# Patient Record
Sex: MALE | Race: WHITE | NOT HISPANIC OR LATINO
[De-identification: names, ages, dates, MRNs, and addresses within clinical notes are randomized per-mention and may not be internally consistent; named-entity substitution may affect disease eponyms.]

---

## 2019-01-16 ENCOUNTER — RX RENEWAL (OUTPATIENT)
Age: 64
End: 2019-01-16

## 2019-10-04 ENCOUNTER — CLINICAL ADVICE (OUTPATIENT)
Age: 64
End: 2019-10-04

## 2019-12-05 ENCOUNTER — INBOUND DOCUMENT (OUTPATIENT)
Age: 64
End: 2019-12-05

## 2020-01-03 ENCOUNTER — RECORD ABSTRACTING (OUTPATIENT)
Age: 65
End: 2020-01-03

## 2020-01-03 ENCOUNTER — APPOINTMENT (OUTPATIENT)
Dept: BARIATRICS | Facility: CLINIC | Age: 65
End: 2020-01-03
Payer: MEDICARE

## 2020-01-03 VITALS
DIASTOLIC BLOOD PRESSURE: 71 MMHG | HEIGHT: 69 IN | BODY MASS INDEX: 32.58 KG/M2 | SYSTOLIC BLOOD PRESSURE: 165 MMHG | WEIGHT: 220 LBS | HEART RATE: 83 BPM

## 2020-01-03 DIAGNOSIS — Z87.39 PERSONAL HISTORY OF OTHER DISEASES OF THE MUSCULOSKELETAL SYSTEM AND CONNECTIVE TISSUE: ICD-10-CM

## 2020-01-03 DIAGNOSIS — Z86.69 PERSONAL HISTORY OF OTHER DISEASES OF THE NERVOUS SYSTEM AND SENSE ORGANS: ICD-10-CM

## 2020-01-03 DIAGNOSIS — Z86.39 PERSONAL HISTORY OF OTHER ENDOCRINE, NUTRITIONAL AND METABOLIC DISEASE: ICD-10-CM

## 2020-01-03 DIAGNOSIS — Z85.46 PERSONAL HISTORY OF MALIGNANT NEOPLASM OF PROSTATE: ICD-10-CM

## 2020-01-03 DIAGNOSIS — Z86.79 PERSONAL HISTORY OF OTHER DISEASES OF THE CIRCULATORY SYSTEM: ICD-10-CM

## 2020-01-03 PROCEDURE — 99205 OFFICE O/P NEW HI 60 MIN: CPT

## 2020-01-03 RX ORDER — FAMOTIDINE 20 MG/1
20 TABLET, FILM COATED ORAL
Qty: 180 | Refills: 3 | Status: DISCONTINUED | COMMUNITY
Start: 2019-10-04 | End: 2020-01-03

## 2020-01-03 RX ORDER — RANITIDINE 150 MG/1
150 TABLET ORAL
Qty: 180 | Refills: 3 | Status: DISCONTINUED | COMMUNITY
Start: 2019-01-16 | End: 2020-01-03

## 2020-01-03 RX ORDER — ASPIRIN 81 MG/1
81 TABLET, COATED ORAL
Refills: 0 | Status: ACTIVE | COMMUNITY

## 2020-01-03 NOTE — ASSESSMENT
[FreeTextEntry1] : Behavioral changes discussed today- reduce carbs, increase protein and vegetables, increase water intake, identify emotional eating, prepack lunches or cut down portion sizes of unhealthy foods, healthy snacks\par Provided information for [facility] classes\par WIll refer to RD\par Can consider Contrave- information packet provided- if doing so would only do for 1 month then transfer to [facility] for his surgery/post op period due to naltrexone interaction with opoids\par Discussed interplay between emotional/stress eating and his weight gain\par Will check HbA1C, TSH- will call patient with results and to further discuss medication \par RTO 1 month \par

## 2020-01-03 NOTE — REASON FOR VISIT
[Initial Consultation] : an initial consultation for [Hyperlipidemia] : hyperlipidemia [Hypertension] : hypertension [Obesity] : obesity [Obstructive Sleep Apena] : obstructive sleep apena

## 2020-01-03 NOTE — HISTORY OF PRESENT ILLNESS
[FreeTextEntry1] : 64 year old male for medical weight loss consultation.\par Patient has struggled with his weight over the past year.  Has gained 30 pounds- related to family stressors- his daughter's divorce, moving in with him and his wife with her 2 children (3 and 6 years old).  His daughter works in [place] so he and his wife do most of the childcare.\par Previously lost weight 10 years ago before his daughter's wedding- high protein low carb diet\par Lowest adult weight 167\par Highest weight 220 \par Target weight 189\par Occupation- works with [employer]- sets his own hours, office in [place] but travels to [place]\par Exercise- limited due to knee pain- planning for LTKR 3/2/20 at [facility] Dr. Clanyc \par Water intake- inadequate\par Sleep- treated OMAR- grandchildren wake him up in the middle of the night\par Food recall- B- eggs, sausage, toast L- pizza or leftovers D- ravioli with meatballs S- chips or cheese-its between lunch and dinner\par Motivation- better health

## 2020-01-03 NOTE — CONSULT LETTER
[Dear  ___] : Dear  [unfilled], [Consult Letter:] : I had the pleasure of evaluating your patient, [unfilled]. [( Thank you for referring [unfilled] for consultation for _____ )] : Thank you for referring [unfilled] for consultation for [unfilled] [Consult Closing:] : Thank you very much for allowing me to participate in the care of this patient.  If you have any questions, please do not hesitate to contact me. [Please see my note below.] : Please see my note below. [Sincerely,] : Sincerely, [FreeTextEntry3] : Gwendolyn Barnett

## 2020-01-09 ENCOUNTER — CLINICAL ADVICE (OUTPATIENT)
Age: 65
End: 2020-01-09

## 2020-02-07 ENCOUNTER — RX RENEWAL (OUTPATIENT)
Age: 65
End: 2020-02-07

## 2020-02-07 ENCOUNTER — APPOINTMENT (OUTPATIENT)
Dept: BARIATRICS | Facility: CLINIC | Age: 65
End: 2020-02-07
Payer: MEDICARE

## 2020-02-07 VITALS
SYSTOLIC BLOOD PRESSURE: 117 MMHG | HEART RATE: 76 BPM | WEIGHT: 203 LBS | BODY MASS INDEX: 30.07 KG/M2 | HEIGHT: 69 IN | DIASTOLIC BLOOD PRESSURE: 68 MMHG

## 2020-02-07 DIAGNOSIS — E66.9 OBESITY, UNSPECIFIED: ICD-10-CM

## 2020-02-07 PROCEDURE — 99213 OFFICE O/P EST LOW 20 MIN: CPT

## 2020-02-07 RX ORDER — NALTREXONE HYDROCHLORIDE AND BUPROPION HYDROCHLORIDE 8; 90 MG/1; MG/1
8-90 TABLET, EXTENDED RELEASE ORAL
Qty: 120 | Refills: 0 | Status: DISCONTINUED | COMMUNITY
Start: 2020-01-09 | End: 2020-02-07

## 2020-02-07 NOTE — ASSESSMENT
[FreeTextEntry1] : Patient to decrease Contrave down to 2 tablets/day due to nausea- will switch to bupropion 75mg BID prior to surgery to avoid opioid interaction with naltrexone.  He is paying out of pocket for Contrave through mail order pharmacy- depending on how he feels he may decide to continue on just bupropion long term which would be much less expensive for him.  He is aware that it is off-label.  \par Will continue Nathalia Navarrete- not seeing RD at this time.  May benefit from RD visit in the future when coming off Nathalia Navarrete to avoid weight gain.\par RTO after surgery- approximately 2 months, will call sooner if he has any concerns

## 2020-02-07 NOTE — HISTORY OF PRESENT ILLNESS
[FreeTextEntry1] : 64 year old male for medical weight loss consultation.\par Patient has struggled with his weight over the past year.  Has gained 30 pounds- related to family stressors- his daughter's divorce, moving in with him and his wife with her 2 children (3 and 6 years old).  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 Clancy \par Water intake- inadequate\par Sleep- treated OMAR- grandchildren wake him up in the middle of the night\par Food recall- B- eggs, sausage, toast L- pizza or leftovers D- ravioli with meatballs S- chips or cheese-its between lunch and dinner\par Motivation- better health\par \par 2/7/2020- Patient RTO feeling well.  Lost 17 pounds.  Only side effect of Contrave (3 tablets/day) has been nausea.  Felt better on lower dosage.  Feels a change in his mood, cravings, behaviors (alcohol and gambling too).  Planning for surgery on 3/2/20 for his knee.  Started Nathaliajoshua Navarrete now on 1500kcal diet with good compliance and satiety.

## 2020-02-07 NOTE — REASON FOR VISIT
[Follow-Up Visit] : a follow-up visit for [Hyperlipidemia] : hyperlipidemia [Obesity] : obesity [Obstructive Sleep Apena] : obstructive sleep apena

## 2021-06-14 ENCOUNTER — APPOINTMENT (OUTPATIENT)
Dept: GASTROENTEROLOGY | Facility: CLINIC | Age: 66
End: 2021-06-14

## 2021-10-05 ENCOUNTER — APPOINTMENT (OUTPATIENT)
Dept: GASTROENTEROLOGY | Facility: CLINIC | Age: 66
End: 2021-10-05
Payer: MEDICARE

## 2021-10-05 ENCOUNTER — NON-APPOINTMENT (OUTPATIENT)
Age: 66
End: 2021-10-05

## 2021-10-05 VITALS
WEIGHT: 200 LBS | BODY MASS INDEX: 29.62 KG/M2 | HEART RATE: 75 BPM | TEMPERATURE: 97.1 F | SYSTOLIC BLOOD PRESSURE: 124 MMHG | OXYGEN SATURATION: 98 % | DIASTOLIC BLOOD PRESSURE: 60 MMHG | HEIGHT: 69 IN

## 2021-10-05 DIAGNOSIS — Z00.00 ENCOUNTER FOR GENERAL ADULT MEDICAL EXAMINATION W/OUT ABNORMAL FINDINGS: ICD-10-CM

## 2021-10-05 DIAGNOSIS — Z95.1 PRESENCE OF AORTOCORONARY BYPASS GRAFT: ICD-10-CM

## 2021-10-05 PROCEDURE — 99214 OFFICE O/P EST MOD 30 MIN: CPT

## 2021-10-05 RX ORDER — BUPROPION HYDROCHLORIDE 75 MG/1
75 TABLET, FILM COATED ORAL TWICE DAILY
Qty: 60 | Refills: 2 | Status: DISCONTINUED | COMMUNITY
Start: 2020-02-07 | End: 2021-10-05

## 2021-10-05 RX ORDER — FAMOTIDINE 10 MG/1
TABLET, FILM COATED ORAL
Refills: 0 | Status: DISCONTINUED | COMMUNITY
End: 2021-10-05

## 2021-10-05 NOTE — HISTORY OF PRESENT ILLNESS
[FreeTextEntry1] : patient is doing exceptionally well status post open heart surgery, back in June 4th, 2021, at [facility], with aortic valve replacement, and triple coronary bypass\par his fatigue, weeakness, low energy have all been completely and immediately corrected;  surgeon dr Krzysztof Mata, cardiologist is Dr Chuck Good...\par \par 2.  major symptom now;  is that he has continuous belching, from the time he wakes, untill bedtime, and he wakes up in the morning before breakfast, with more belching\par \par meanwhile he is taking over the counter Nexium, and he has no heartburn, no difficulty swallowing, no real regurgitation\par \par

## 2021-10-05 NOTE — ASSESSMENT
[FreeTextEntry1] : 1. patient had recent extremely successful open heart surgery\par \par 2.  eventually he will need a colonoscopy...history of polyps..last colon july 2016, negkathy advised for five years..but i feel that we are better off deferring for six or so months, \par and i am advising a FIT test as a sensitivie test non invasive, and then next year we do the colonoscopy\par \par meanwhile patient prefers doing it by collecting stool at home\par \par 3.  patient on nexium, has the symptom of constant belching, and it is not at all clear to me that this is reflux...however, we could try temporarily switch to dexilant as the most effective anti reflux drug that we have.. for eight weeks\par \par as a therapeutic trial again as i prefer not beginning a work up with egd

## 2021-10-12 LAB — HEMOCCULT STL QL IA: NEGATIVE

## 2022-04-01 ENCOUNTER — RESULT REVIEW (OUTPATIENT)
Age: 67
End: 2022-04-01

## 2022-04-03 ENCOUNTER — RESULT REVIEW (OUTPATIENT)
Age: 67
End: 2022-04-03

## 2022-04-04 ENCOUNTER — APPOINTMENT (OUTPATIENT)
Dept: GASTROENTEROLOGY | Facility: HOSPITAL | Age: 67
End: 2022-04-04

## 2022-04-04 ENCOUNTER — TRANSCRIPTION ENCOUNTER (OUTPATIENT)
Age: 67
End: 2022-04-04

## 2022-04-05 ENCOUNTER — APPOINTMENT (OUTPATIENT)
Dept: GASTROENTEROLOGY | Facility: CLINIC | Age: 67
End: 2022-04-05

## 2022-04-10 ENCOUNTER — NON-APPOINTMENT (OUTPATIENT)
Age: 67
End: 2022-04-10

## 2022-05-06 ENCOUNTER — APPOINTMENT (OUTPATIENT)
Dept: GASTROENTEROLOGY | Facility: CLINIC | Age: 67
End: 2022-05-06
Payer: MEDICARE

## 2022-05-06 DIAGNOSIS — K21.9 GASTRO-ESOPHAGEAL REFLUX DISEASE W/OUT ESOPHAGITIS: ICD-10-CM

## 2022-05-06 PROCEDURE — 99443: CPT | Mod: 95

## 2022-05-06 NOTE — ASSESSMENT
[FreeTextEntry1] : 1 history of recent removal of adenomatous colonic polyp, follow-up colonoscopy in March 2025\par 2.  Erosive duodenitis with multiple shallow duodenal ulcers and erosions\par Attributed to enteric-coated aspirin even though it was a baby aspirin dose\par He since switched to chewable aspirin or chewable baby aspirin\par 3.  The eructation that he had prior to the procedure has persisted since the procedure as well and is not improved\par This in spite of being on twice daily pantoprazole 40 mg twice daily\par \par Plan\par 1.  Patient has a concern about the long-term use of the PPIs and I share this concern\par 2.  I am suggesting that he take 40 mg of omeprazole once a day in the morning for another month and then decrease for 2 to 3 months to every other morning and then 2 to 3 months for every third morning\par 3.  We will have a return visit telehealth or telephonic in 6 months\par 4.  If he has dyspepsia or worsening eructation on this regimen of reduced omeprazole dosage we can always give him 20 mg of Pepcid in the morning on the days that he does not have omeprazole

## 2022-05-06 NOTE — HISTORY OF PRESENT ILLNESS
[FreeTextEntry1] : This is an audio visit\par Consent on file\par Just the 2 of us\par Patient at home and I am in my [place] office\par This is a follow-up to our procedures 1 month ago\par He had a colonoscopy\par Which revealed an adenomatous colonic polyp which was removed, and his follow-up colonoscopy is set for March 2025\par \par He had an EGD, and the surprise finding was multiple shallow ulcers and erosions in the descending duodenum benign biopsies and negative H. pylori in the gastric biopsy\par \par I attributed this to the use of enteric-coated baby aspirin with dissolution of the tablet in the descending duodenum because the duodenal pathology\par \par He also had a lot of eructation prior to the procedure and since with no diminution on PPI therapy\par He had taken Dexilant and since the procedure I had him on omeprazole 40 mg/day\par He does have some concerns about being on long-term PPI therapy.\par \par Previous history of aortic valve replacement so he received antibiotic prophylaxis for his

## 2022-08-30 ENCOUNTER — APPOINTMENT (OUTPATIENT)
Dept: PULMONOLOGY | Facility: CLINIC | Age: 67
End: 2022-08-30

## 2022-11-01 ENCOUNTER — APPOINTMENT (OUTPATIENT)
Dept: PULMONOLOGY | Facility: CLINIC | Age: 67
End: 2022-11-01

## 2022-11-07 ENCOUNTER — APPOINTMENT (OUTPATIENT)
Dept: GASTROENTEROLOGY | Facility: CLINIC | Age: 67
End: 2022-11-07

## 2022-11-07 VITALS
SYSTOLIC BLOOD PRESSURE: 144 MMHG | BODY MASS INDEX: 31.4 KG/M2 | DIASTOLIC BLOOD PRESSURE: 82 MMHG | WEIGHT: 212 LBS | TEMPERATURE: 97 F | HEIGHT: 69 IN | HEART RATE: 97 BPM

## 2022-11-07 DIAGNOSIS — Z82.49 FAMILY HISTORY OF ISCHEMIC HEART DISEASE AND OTHER DISEASES OF THE CIRCULATORY SYSTEM: ICD-10-CM

## 2022-11-07 DIAGNOSIS — K26.7 CHRONIC DUODENAL ULCER W/OUT HEMORRHAGE OR PERFORATION: ICD-10-CM

## 2022-11-07 DIAGNOSIS — Z87.891 PERSONAL HISTORY OF NICOTINE DEPENDENCE: ICD-10-CM

## 2022-11-07 DIAGNOSIS — Z95.2 PRESENCE OF PROSTHETIC HEART VALVE: ICD-10-CM

## 2022-11-07 PROCEDURE — 99214 OFFICE O/P EST MOD 30 MIN: CPT

## 2022-11-07 RX ORDER — ATORVASTATIN CALCIUM 80 MG/1
80 TABLET, FILM COATED ORAL
Refills: 0 | Status: ACTIVE | COMMUNITY

## 2022-11-07 RX ORDER — AMLODIPINE BESYLATE 5 MG/1
5 TABLET ORAL
Refills: 0 | Status: ACTIVE | COMMUNITY

## 2022-11-07 RX ORDER — AMOXICILLIN 500 MG/1
500 TABLET, FILM COATED ORAL
Qty: 4 | Refills: 0 | Status: COMPLETED | COMMUNITY
Start: 2022-04-04 | End: 2022-11-07

## 2022-11-07 RX ORDER — LOSARTAN POTASSIUM 100 MG/1
100 TABLET, FILM COATED ORAL
Refills: 0 | Status: ACTIVE | COMMUNITY

## 2022-11-07 RX ORDER — DEXLANSOPRAZOLE 60 MG/1
60 CAPSULE, DELAYED RELEASE ORAL
Qty: 90 | Refills: 3 | Status: DISCONTINUED | COMMUNITY
Start: 2021-10-05 | End: 2022-11-07

## 2022-11-10 NOTE — HISTORY OF PRESENT ILLNESS
[FreeTextEntry1] : in office visit\par \par one symptom;  belching every am\par especially on every other day omeprazole\par but belching is frequent throughout the day.\par \par no heartburn, just belching\par didn’t take famotidine\par there was no sensitivity to esophageal irritants\par belching is his only symptom\par so belching appears to be his major symptom\par could be just aerophagia.

## 2023-04-17 ENCOUNTER — APPOINTMENT (OUTPATIENT)
Dept: GASTROENTEROLOGY | Facility: CLINIC | Age: 68
End: 2023-04-17
Payer: MEDICARE

## 2023-04-17 VITALS
SYSTOLIC BLOOD PRESSURE: 97 MMHG | HEIGHT: 69 IN | RESPIRATION RATE: 18 BRPM | OXYGEN SATURATION: 99 % | BODY MASS INDEX: 31.1 KG/M2 | HEART RATE: 72 BPM | DIASTOLIC BLOOD PRESSURE: 55 MMHG | WEIGHT: 210 LBS

## 2023-04-17 DIAGNOSIS — K22.10 ULCER OF ESOPHAGUS W/OUT BLEEDING: ICD-10-CM

## 2023-04-17 DIAGNOSIS — Z79.899 OTHER LONG TERM (CURRENT) DRUG THERAPY: ICD-10-CM

## 2023-04-17 DIAGNOSIS — R14.2 ERUCTATION: ICD-10-CM

## 2023-04-17 PROCEDURE — 99214 OFFICE O/P EST MOD 30 MIN: CPT

## 2023-04-17 NOTE — HISTORY OF PRESENT ILLNESS
[FreeTextEntry1] : 1.  patient whose primary symptom, frequent belching, which is definitely in part due to acid reflux\par \par the reason i say this\par a. first, he has known erosive esophagitis, egd done last year\par b.  when he goes off omeprazole, his reflux and belching increase, but when he puts himself back on  omeprazole, the belching decreases by a significant amt, but not completely\par \par somewhat puzzling;  using an even more potent regimen, such as dexilant or bid ppi, does not give him greater relief than omeprazole alone\par \par new factor and potentially impt, is he is now on Ozempic, once per week, ordered by his cardiologist, dr Chuck Freitas, [place] \par \par it was impt from cv standpoint to lose some weight, he felt

## 2023-04-17 NOTE — ASSESSMENT
[FreeTextEntry1] : 1.  patient clearly has reflux, and we need to optimally control this, to try to help the belching as much as we can\par 2.  i will continue for now, the omeprazole at this current dose\par 3.  add to this;  continued weight loss, and also, the reflux gourmet, and we may not need as regular a dose of omeprazole\par 4.  concern;  Jameson has been on ppi therapy more or less continuously for thirty years plus\par 5.  i would ask him to discuss with dr Chong the advisability of having a bone density test because of concerns about osteoporosis, and sugges a bone density every three years\par 6.  also regular monitoring of renal parameters, and yearly serum magnesium\par \par fu visit in one year...keep working on omeprazole reduction once further weight loss is achieved\par \par More than 50% of the face to face time was devoted to counseling and /or coordination of care.  THis coordination of care may have included reviewing other medical notes and reports, and communicating with other health professionals\par

## 2023-04-17 NOTE — REASON FOR VISIT
[Follow-up] : a follow-up of an existing diagnosis [FreeTextEntry1] : existing conditions, reflux erosive esophagits, constant belching

## 2023-08-23 RX ORDER — OMEPRAZOLE 40 MG/1
40 CAPSULE, DELAYED RELEASE ORAL TWICE DAILY
Qty: 120 | Refills: 3 | Status: ACTIVE | COMMUNITY
Start: 2023-08-23 | End: 1900-01-01

## 2023-11-07 ENCOUNTER — APPOINTMENT (OUTPATIENT)
Dept: GASTROENTEROLOGY | Facility: CLINIC | Age: 68
End: 2023-11-07

## 2023-12-05 ENCOUNTER — APPOINTMENT (OUTPATIENT)
Dept: GASTROENTEROLOGY | Facility: CLINIC | Age: 68
End: 2023-12-05
Payer: MEDICARE

## 2023-12-05 VITALS
BODY MASS INDEX: 31.1 KG/M2 | HEIGHT: 69 IN | WEIGHT: 210 LBS | SYSTOLIC BLOOD PRESSURE: 130 MMHG | DIASTOLIC BLOOD PRESSURE: 80 MMHG

## 2023-12-05 DIAGNOSIS — K52.9 NONINFECTIVE GASTROENTERITIS AND COLITIS, UNSPECIFIED: ICD-10-CM

## 2023-12-05 PROCEDURE — 99214 OFFICE O/P EST MOD 30 MIN: CPT

## 2023-12-05 RX ORDER — OMEPRAZOLE 40 MG/1
40 CAPSULE, DELAYED RELEASE ORAL TWICE DAILY
Qty: 120 | Refills: 3 | Status: COMPLETED | COMMUNITY
Start: 2022-04-04 | End: 2023-12-05

## 2023-12-06 DIAGNOSIS — F41.9 ANXIETY DISORDER, UNSPECIFIED: ICD-10-CM

## 2023-12-06 RX ORDER — ALPRAZOLAM 0.25 MG/1
0.25 TABLET ORAL EVERY 6 HOURS
Qty: 4 | Refills: 0 | Status: ACTIVE | COMMUNITY
Start: 2023-12-06 | End: 1900-01-01

## 2023-12-08 ENCOUNTER — LABORATORY RESULT (OUTPATIENT)
Age: 68
End: 2023-12-08

## 2023-12-14 ENCOUNTER — RESULT REVIEW (OUTPATIENT)
Age: 68
End: 2023-12-14

## 2023-12-14 NOTE — ASSESSMENT
[FreeTextEntry1] : Ileitis - Discussed case with Dr. Aj. Will obtain MRE for further assessment.  - Check labs today.  - Xanax ordered for MRE procedure, patient reported he is claustrophobic.   Chronic pancreatitis on imaging - Cautioned ETOH use coupled with Ozempic therapy. Recommended patient f/u with endocrinologist to see if this is the best med considering this finding.

## 2023-12-14 NOTE — HISTORY OF PRESENT ILLNESS
[FreeTextEntry1] : 4/4/22 Dr. Aj Notable for a 6-8mm sessile cecal polyp (tubular adenoma).  Mild diverticulosis.  Recall 3 years.  [de-identified] : Exam Date: 	  11/27/23					 Exam: 	CT ABD/PEL OC					 Order#:	[id number]					                CLINICAL INFORMATION: Abdominal pain.   COMPARISON: None.  CONTRAST/COMPLICATIONS: IV Contrast: NONE Oral Contrast: Omnipaque 300 Complications: None reported at time of study completion   PROCEDURE:  CT of the Abdomen and Pelvis was performed. Sagittal and coronal reformats were performed.  FINDINGS: LOWER CHEST: Aortic valve replacement. The lung bases are clear.  LIVER: Within normal limits. BILE DUCTS: Normal caliber. GALLBLADDER: Within normal limits. SPLEEN: Within normal limits. PANCREAS: Mild fatty replacement. Punctate calcifications in the pancreatic head, compatible chronic pancreatitis. Otherwise, within normal limits. ADRENALS: Within normal limits. KIDNEYS/URETERS: Mild bilateral perinephric stranding. Bilateral simple renal cysts. Tiny proteinaceous right renal lower pole cyst. Otherwise, within normal limits.  BLADDER: Minimally distended. REPRODUCTIVE ORGANS: Prostatectomy.  BOWEL: There is mild concentric low-attenuation wall thickening of mid and distal small bowel extending to the terminal ileum with mild stranding of the adjacent mesentery. There is no pneumatosis or extraluminal gas. The thickened loops are patulous to 3.8 cm. There is no mesenteric adenopathy.  Mild stool throughout the colon. PERITONEUM: No ascites. VESSELS: Mild atherosclerotic calcification of the nonaneurysmal abdominal aorta. RETROPERITONEUM/LYMPH NODES: No lymphadenopathy.   ABDOMINAL WALL: Small fat-containing umbilical hernia. Small fat-containing left inguinal hernia. BONES: Discectomy with spacer placement and transpedicular screw and kesha fixation at L5-S1.   IMPRESSION: Diffuse mild concentric low-attenuation wall thickening of the mildly patulous mid and distal small bowel extending to the terminal ileum with mild stranding of the adjacent mesentery. Differential considerations include infectious and ischemic ileitis.

## 2023-12-20 ENCOUNTER — NON-APPOINTMENT (OUTPATIENT)
Age: 68
End: 2023-12-20

## 2024-02-08 ENCOUNTER — APPOINTMENT (OUTPATIENT)
Dept: GASTROENTEROLOGY | Facility: CLINIC | Age: 69
End: 2024-02-08

## 2024-04-09 ENCOUNTER — APPOINTMENT (OUTPATIENT)
Dept: GASTROENTEROLOGY | Facility: CLINIC | Age: 69
End: 2024-04-09

## 2024-06-25 ENCOUNTER — APPOINTMENT (OUTPATIENT)
Dept: PULMONOLOGY | Facility: CLINIC | Age: 69
End: 2024-06-25
Payer: MEDICARE

## 2024-06-25 VITALS — HEART RATE: 76 BPM | BODY MASS INDEX: 33.34 KG/M2 | HEIGHT: 68 IN | WEIGHT: 220 LBS | OXYGEN SATURATION: 97 %

## 2024-06-25 DIAGNOSIS — Z78.9 OTHER SPECIFIED HEALTH STATUS: ICD-10-CM

## 2024-06-25 DIAGNOSIS — G47.33 OBSTRUCTIVE SLEEP APNEA (ADULT) (PEDIATRIC): ICD-10-CM

## 2024-06-25 PROCEDURE — 99203 OFFICE O/P NEW LOW 30 MIN: CPT

## 2024-06-25 RX ORDER — SEMAGLUTIDE 1.34 MG/ML
2 INJECTION, SOLUTION SUBCUTANEOUS
Refills: 0 | Status: DISCONTINUED | COMMUNITY
End: 2024-06-25

## 2024-07-10 RX ORDER — OMEPRAZOLE 40 MG/1
40 CAPSULE, DELAYED RELEASE ORAL
Qty: 90 | Refills: 3 | Status: ACTIVE | COMMUNITY
Start: 2024-07-10 | End: 1900-01-01

## 2024-07-29 ENCOUNTER — APPOINTMENT (OUTPATIENT)
Dept: PULMONOLOGY | Facility: CLINIC | Age: 69
End: 2024-07-29

## 2024-07-29 VITALS — BODY MASS INDEX: 33.65 KG/M2 | WEIGHT: 222 LBS | HEIGHT: 68 IN

## 2024-07-29 DIAGNOSIS — G47.33 OBSTRUCTIVE SLEEP APNEA (ADULT) (PEDIATRIC): ICD-10-CM

## 2024-07-29 DIAGNOSIS — E66.9 OBESITY, UNSPECIFIED: ICD-10-CM

## 2024-07-29 DIAGNOSIS — Z95.1 PRESENCE OF AORTOCORONARY BYPASS GRAFT: ICD-10-CM

## 2024-07-29 PROCEDURE — 99213 OFFICE O/P EST LOW 20 MIN: CPT

## 2024-07-29 RX ORDER — METOPROLOL SUCCINATE 50 MG/1
50 TABLET, EXTENDED RELEASE ORAL
Qty: 90 | Refills: 0 | Status: ACTIVE | COMMUNITY
Start: 2024-05-16

## 2024-07-29 RX ORDER — IPRATROPIUM BROMIDE 21 UG/1
0.03 SPRAY NASAL
Qty: 90 | Refills: 0 | Status: ACTIVE | COMMUNITY
Start: 2024-05-21

## 2024-07-29 RX ORDER — TIRZEPATIDE 2.5 MG/.5ML
2.5 INJECTION, SOLUTION SUBCUTANEOUS
Qty: 4 | Refills: 2 | Status: ACTIVE | COMMUNITY
Start: 2024-07-29 | End: 1900-01-01

## 2024-07-29 RX ORDER — TADALAFIL 5 MG/1
5 TABLET ORAL
Qty: 90 | Refills: 0 | Status: ACTIVE | COMMUNITY
Start: 2023-10-24

## 2024-07-29 NOTE — HISTORY OF PRESENT ILLNESS
[FreeTextEntry1] : Manav Penaloza 69 year old man with history of cad s.p cabg, aortic valve replacement, roshni  is here in the sleep center to address sleep apnea. Patient was diagnosed with severe sleep anpea years ago, he was on cpap therapy. But after the cardiac surgery 3 yrs ago he stopped using the cpap. He feels there are symptoms of sleep apnea still and wants to resume therapy.   Patient is sleepy with Silvis sleepiness score of 14.  Patient does not snore as much, does not have any witnessed apneas.  Patient's bedtime is around 9 PM wakes up in the morning around 4.30 AM.   He feels tired when he wakes up.  Patient drinks few cups of coffee during the daytime. Patient does not have any headaches, has nocturia 3 times a night. He is not sleepy while driving. STOPBANG score - 5 neck size - 17 inches  On 7/24/24, pt completed PSG revealing severe ROSHNI (AHI = 50.7/hr).  7/29/24: Discussed with pt results of PSG indicating severe sleep apnea. Pt agreeable to CPAP therapy, was previously using but stopped >3 years ago. Believes he had recalled [employer] machine and never received replacement. Pt interested in medical management of weight loss, interested in Zepbound.

## 2024-07-29 NOTE — PHYSICAL EXAM
[General Appearance - Well Developed] : well developed [Normal Appearance] : normal appearance [General Appearance - In No Acute Distress] : no acute distress [Oriented To Time, Place, And Person] : oriented to person, place, and time [Impaired Insight] : insight and judgment were intact [TextEntry] :  PE limited as today's visit was a video visit.

## 2024-07-29 NOTE — REASON FOR VISIT
[Follow-Up] : a follow-up visit [TextEntry] :  This visit is done virtually as per patients request. Patient understood limitations of tele visit and agrees to move forward. patients location - [place] doctors location - [place] identification verified with patients name and date of birth. [FreeTextEntry1] : Sleep Study Results

## 2024-08-30 RX ORDER — TIRZEPATIDE 5 MG/.5ML
5 INJECTION, SOLUTION SUBCUTANEOUS
Qty: 1 | Refills: 0 | Status: ACTIVE | COMMUNITY
Start: 2024-08-27 | End: 1900-01-01

## 2024-09-17 RX ORDER — TIRZEPATIDE 10 MG/.5ML
10 INJECTION, SOLUTION SUBCUTANEOUS DAILY
Qty: 4 | Refills: 1 | Status: ACTIVE | COMMUNITY
Start: 2024-09-17 | End: 1900-01-01

## 2024-10-29 ENCOUNTER — APPOINTMENT (OUTPATIENT)
Dept: PULMONOLOGY | Facility: CLINIC | Age: 69
End: 2024-10-29
Payer: MEDICARE

## 2024-10-29 VITALS
HEIGHT: 68 IN | OXYGEN SATURATION: 99 % | DIASTOLIC BLOOD PRESSURE: 60 MMHG | BODY MASS INDEX: 31.83 KG/M2 | SYSTOLIC BLOOD PRESSURE: 90 MMHG | HEART RATE: 66 BPM | WEIGHT: 210 LBS

## 2024-10-29 DIAGNOSIS — G47.33 OBSTRUCTIVE SLEEP APNEA (ADULT) (PEDIATRIC): ICD-10-CM

## 2024-10-29 DIAGNOSIS — E66.9 OBESITY, UNSPECIFIED: ICD-10-CM

## 2024-10-29 DIAGNOSIS — N17.9 ACUTE KIDNEY FAILURE, UNSPECIFIED: ICD-10-CM

## 2024-10-29 PROCEDURE — 99214 OFFICE O/P EST MOD 30 MIN: CPT

## 2024-10-30 RX ORDER — TIRZEPATIDE 12.5 MG/.5ML
12.5 INJECTION, SOLUTION SUBCUTANEOUS WEEKLY
Qty: 4 | Refills: 1 | Status: ACTIVE | COMMUNITY
Start: 2024-10-30 | End: 1900-01-01

## 2025-01-13 ENCOUNTER — APPOINTMENT (OUTPATIENT)
Dept: GASTROENTEROLOGY | Facility: CLINIC | Age: 70
End: 2025-01-13
Payer: MEDICARE

## 2025-01-13 DIAGNOSIS — Z95.2 PRESENCE OF PROSTHETIC HEART VALVE: ICD-10-CM

## 2025-01-13 DIAGNOSIS — K22.10 ULCER OF ESOPHAGUS W/OUT BLEEDING: ICD-10-CM

## 2025-01-13 DIAGNOSIS — D49.0 NEOPLASM OF UNSPECIFIED BEHAVIOR OF DIGESTIVE SYSTEM: ICD-10-CM

## 2025-01-13 DIAGNOSIS — Z86.0100 PERSONAL HISTORY OF COLON POLYPS, UNSPECIFIED: ICD-10-CM

## 2025-01-13 PROCEDURE — 99213 OFFICE O/P EST LOW 20 MIN: CPT

## 2025-01-13 RX ORDER — SODIUM PICOSULFATE, MAGNESIUM OXIDE, AND ANHYDROUS CITRIC ACID 12; 3.5; 1 G/175ML; G/175ML; MG/175ML
10-3.5-12 MG-GM LIQUID ORAL
Qty: 2 | Refills: 1 | Status: ACTIVE | COMMUNITY
Start: 2025-01-13 | End: 1900-01-01

## 2025-01-13 RX ORDER — FAMOTIDINE 20 MG/1
20 TABLET, FILM COATED ORAL
Qty: 180 | Refills: 3 | Status: ACTIVE | COMMUNITY
Start: 2025-01-13 | End: 1900-01-01

## 2025-02-11 NOTE — ASSESSMENT
Last seen 08/15/24   [FreeTextEntry1] : 1.  patient has symptoms that are not really suggestive of reflux;  belching is his persistent symptom, yet belching should not be the sole manifestation of esophageal reflux\par 2.  the very fact that ppi therapy, even the dexilant, failed to control this symptom is further proof\par 3. could try reflux gourmet for rapid relief of heartburn\par 4.  the persistent use of daily ppi since it is ineffective should be discouraged...again i strongly advise decreasing to every other day, for a month, then every third day for a month\par \par anti gas preparations\par gax x\par phasyme which has simethicone\par beano is for gas a pancreatic enzyme..\par \par pcp is Dr Chong\par \par needs a bone density test as the bone density is one of the sensitive gages of possible ppi toxicity\par \par if the patient has not had a serum magnesium i would advise he have it

## 2025-02-25 ENCOUNTER — APPOINTMENT (OUTPATIENT)
Dept: PULMONOLOGY | Facility: CLINIC | Age: 70
End: 2025-02-25
Payer: MEDICARE

## 2025-02-25 VITALS
RESPIRATION RATE: 16 BRPM | HEART RATE: 73 BPM | WEIGHT: 198 LBS | DIASTOLIC BLOOD PRESSURE: 68 MMHG | OXYGEN SATURATION: 98 % | SYSTOLIC BLOOD PRESSURE: 120 MMHG | BODY MASS INDEX: 30.01 KG/M2 | HEIGHT: 68 IN

## 2025-02-25 VITALS — BODY MASS INDEX: 30.01 KG/M2 | WEIGHT: 198 LBS | HEIGHT: 68 IN

## 2025-02-25 DIAGNOSIS — G47.33 OBSTRUCTIVE SLEEP APNEA (ADULT) (PEDIATRIC): ICD-10-CM

## 2025-02-25 DIAGNOSIS — E66.9 OBESITY, UNSPECIFIED: ICD-10-CM

## 2025-02-25 PROCEDURE — 99213 OFFICE O/P EST LOW 20 MIN: CPT

## 2025-04-09 ENCOUNTER — APPOINTMENT (OUTPATIENT)
Dept: GASTROENTEROLOGY | Facility: HOSPITAL | Age: 70
End: 2025-04-09

## 2025-04-09 ENCOUNTER — RESULT REVIEW (OUTPATIENT)
Age: 70
End: 2025-04-09

## 2025-04-09 ENCOUNTER — TRANSCRIPTION ENCOUNTER (OUTPATIENT)
Age: 70
End: 2025-04-09

## 2025-05-20 ENCOUNTER — APPOINTMENT (OUTPATIENT)
Dept: GASTROENTEROLOGY | Facility: CLINIC | Age: 70
End: 2025-05-20
Payer: MEDICARE

## 2025-05-20 DIAGNOSIS — Z86.0100 PERSONAL HISTORY OF COLON POLYPS, UNSPECIFIED: ICD-10-CM

## 2025-05-20 DIAGNOSIS — K22.10 ULCER OF ESOPHAGUS W/OUT BLEEDING: ICD-10-CM

## 2025-05-20 DIAGNOSIS — K26.7 CHRONIC DUODENAL ULCER W/OUT HEMORRHAGE OR PERFORATION: ICD-10-CM

## 2025-05-20 DIAGNOSIS — R14.2 ERUCTATION: ICD-10-CM

## 2025-05-20 PROCEDURE — 99213 OFFICE O/P EST LOW 20 MIN: CPT | Mod: 2W

## 2025-05-27 ENCOUNTER — APPOINTMENT (OUTPATIENT)
Dept: PULMONOLOGY | Facility: CLINIC | Age: 70
End: 2025-05-27
Payer: MEDICARE

## 2025-05-27 VITALS — HEIGHT: 68 IN | WEIGHT: 196 LBS | BODY MASS INDEX: 29.7 KG/M2

## 2025-05-27 DIAGNOSIS — G47.33 OBSTRUCTIVE SLEEP APNEA (ADULT) (PEDIATRIC): ICD-10-CM

## 2025-05-27 DIAGNOSIS — E66.9 OBESITY, UNSPECIFIED: ICD-10-CM

## 2025-05-27 PROCEDURE — 99213 OFFICE O/P EST LOW 20 MIN: CPT | Mod: 2W

## 2025-05-28 ENCOUNTER — RESULT REVIEW (OUTPATIENT)
Age: 70
End: 2025-05-28